# Patient Record
Sex: MALE | Race: WHITE | NOT HISPANIC OR LATINO
[De-identification: names, ages, dates, MRNs, and addresses within clinical notes are randomized per-mention and may not be internally consistent; named-entity substitution may affect disease eponyms.]

---
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
## 2021-02-03 RX ORDER — FERROUS ASPARTO GLYCINATE, IRON, ASCORBIC ACID, FOLIC ACID, CYANOCOBALAMIN, ZINC, SUCCINIC ACID, AND INTRINSIC FACTOR 50; 100; 60; 750; 250; 25; 15; 50; 100 MG/1; MG/1; MG/1; UG/1; UG/1; UG/1; MG/1; MG/1; MG/1
TABLET ORAL
Qty: 60 | Refills: 10 | Status: ACTIVE | COMMUNITY
Start: 2017-05-01 | End: 1900-01-01

## 2021-09-30 NOTE — ED PEDIATRIC NURSE NOTE - NEURO SENSATION
Called pt to verify this is a new issue and she stated that she has been having issues with her left arm but has been seen in the past for her right. She believes that it is from overuse from using that arm more following her right shoulder sx but wanted to be evaluated.
sensory intact

## 2023-06-08 ENCOUNTER — NON-APPOINTMENT (OUTPATIENT)
Age: 23
End: 2023-06-08

## 2023-07-20 PROBLEM — I78.0 HEREDITARY HEMORRHAGIC TELANGIECTASIA: Chronic | Status: ACTIVE | Noted: 2020-06-28

## 2023-07-27 ENCOUNTER — OUTPATIENT (OUTPATIENT)
Dept: OUTPATIENT SERVICES | Facility: HOSPITAL | Age: 23
LOS: 1 days | End: 2023-07-27
Payer: COMMERCIAL

## 2023-07-27 ENCOUNTER — APPOINTMENT (OUTPATIENT)
Dept: CT IMAGING | Facility: CLINIC | Age: 23
End: 2023-07-27
Payer: COMMERCIAL

## 2023-07-27 DIAGNOSIS — Z00.8 ENCOUNTER FOR OTHER GENERAL EXAMINATION: ICD-10-CM

## 2023-07-27 PROCEDURE — 71250 CT THORAX DX C-: CPT

## 2023-07-27 PROCEDURE — 71250 CT THORAX DX C-: CPT | Mod: 26
